# Patient Record
Sex: MALE | Race: WHITE | Employment: OTHER | URBAN - METROPOLITAN AREA
[De-identification: names, ages, dates, MRNs, and addresses within clinical notes are randomized per-mention and may not be internally consistent; named-entity substitution may affect disease eponyms.]

---

## 2017-07-12 ENCOUNTER — HOSPITAL ENCOUNTER (OUTPATIENT)
Dept: OTHER | Age: 52
Discharge: HOME | End: 2017-07-12
Attending: NURSE PRACTITIONER | Admitting: NURSE PRACTITIONER

## 2017-07-12 DIAGNOSIS — M25.511 ACUTE PAIN OF RIGHT SHOULDER: ICD-10-CM

## 2017-07-25 ENCOUNTER — OFFICE VISIT (OUTPATIENT)
Dept: ORTHOPEDIC SURGERY | Age: 52
End: 2017-07-25

## 2017-07-25 VITALS — WEIGHT: 170 LBS | HEIGHT: 70 IN | BODY MASS INDEX: 24.34 KG/M2

## 2017-07-25 DIAGNOSIS — M25.511 RIGHT SHOULDER PAIN, UNSPECIFIED CHRONICITY: Primary | ICD-10-CM

## 2017-07-25 PROCEDURE — 73030 X-RAY EXAM OF SHOULDER: CPT | Performed by: ORTHOPAEDIC SURGERY

## 2017-07-25 PROCEDURE — 99243 OFF/OP CNSLTJ NEW/EST LOW 30: CPT | Performed by: ORTHOPAEDIC SURGERY

## 2017-07-26 ENCOUNTER — TELEPHONE (OUTPATIENT)
Dept: ORTHOPEDIC SURGERY | Age: 52
End: 2017-07-26

## 2017-08-01 ENCOUNTER — OFFICE VISIT (OUTPATIENT)
Dept: ORTHOPEDIC SURGERY | Age: 52
End: 2017-08-01

## 2017-08-01 VITALS — HEIGHT: 70 IN | HEART RATE: 80 BPM | BODY MASS INDEX: 24.33 KG/M2 | WEIGHT: 169.97 LBS

## 2017-08-01 DIAGNOSIS — M75.41 SUBACROMIAL IMPINGEMENT OF RIGHT SHOULDER: ICD-10-CM

## 2017-08-01 DIAGNOSIS — M75.81 ROTATOR CUFF TENDONITIS, RIGHT: Primary | ICD-10-CM

## 2017-08-01 DIAGNOSIS — M75.111 INCOMPLETE TEAR OF RIGHT ROTATOR CUFF: ICD-10-CM

## 2017-08-01 DIAGNOSIS — M75.21 BICEPS TENDONITIS ON RIGHT: ICD-10-CM

## 2017-08-01 PROBLEM — M75.80 ROTATOR CUFF TENDONITIS: Status: ACTIVE | Noted: 2017-08-01

## 2017-08-01 PROCEDURE — 20610 DRAIN/INJ JOINT/BURSA W/O US: CPT | Performed by: ORTHOPAEDIC SURGERY

## 2017-08-01 PROCEDURE — 99214 OFFICE O/P EST MOD 30 MIN: CPT | Performed by: ORTHOPAEDIC SURGERY

## 2017-08-01 RX ORDER — MELOXICAM 15 MG/1
15 TABLET ORAL DAILY
Qty: 30 TABLET | Refills: 3 | Status: SHIPPED | OUTPATIENT
Start: 2017-08-01 | End: 2018-09-13 | Stop reason: ALTCHOICE

## 2017-08-01 RX ORDER — DEXAMETHASONE SODIUM PHOSPHATE 4 MG/ML
INJECTION, SOLUTION INTRA-ARTICULAR; INTRALESIONAL; INTRAMUSCULAR; INTRAVENOUS; SOFT TISSUE
Qty: 30 ML | Refills: 0 | Status: SHIPPED | OUTPATIENT
Start: 2017-08-01 | End: 2018-09-13

## 2017-08-08 ENCOUNTER — HOSPITAL ENCOUNTER (OUTPATIENT)
Dept: PHYSICAL THERAPY | Age: 52
Discharge: HOME | End: 2017-08-31
Admitting: ORTHOPAEDIC SURGERY

## 2018-09-13 ENCOUNTER — HOSPITAL ENCOUNTER (EMERGENCY)
Age: 53
Discharge: HOME | End: 2018-09-13
Attending: EMERGENCY MEDICINE
Payer: MEDICAID

## 2018-09-13 VITALS
BODY MASS INDEX: 23.8 KG/M2 | DIASTOLIC BLOOD PRESSURE: 106 MMHG | TEMPERATURE: 98.2 F | RESPIRATION RATE: 17 BRPM | SYSTOLIC BLOOD PRESSURE: 162 MMHG | WEIGHT: 170 LBS | HEIGHT: 71 IN | OXYGEN SATURATION: 99 % | HEART RATE: 93 BPM

## 2018-09-13 DIAGNOSIS — J02.9 ACUTE PHARYNGITIS, UNSPECIFIED ETIOLOGY: Primary | ICD-10-CM

## 2018-09-13 PROCEDURE — 99282 EMERGENCY DEPT VISIT SF MDM: CPT

## 2018-09-13 PROCEDURE — 6360000002 HC RX W HCPCS: Performed by: EMERGENCY MEDICINE

## 2018-09-13 PROCEDURE — 96372 THER/PROPH/DIAG INJ SC/IM: CPT

## 2018-09-13 RX ORDER — KETOROLAC TROMETHAMINE 30 MG/ML
30 INJECTION, SOLUTION INTRAMUSCULAR; INTRAVENOUS ONCE
Status: COMPLETED | OUTPATIENT
Start: 2018-09-13 | End: 2018-09-13

## 2018-09-13 RX ORDER — DEXAMETHASONE SODIUM PHOSPHATE 4 MG/ML
4 INJECTION, SOLUTION INTRA-ARTICULAR; INTRALESIONAL; INTRAMUSCULAR; INTRAVENOUS; SOFT TISSUE ONCE
Status: COMPLETED | OUTPATIENT
Start: 2018-09-13 | End: 2018-09-13

## 2018-09-13 RX ADMIN — KETOROLAC TROMETHAMINE 30 MG: 30 INJECTION, SOLUTION INTRAMUSCULAR; INTRAVENOUS at 01:13

## 2018-09-13 RX ADMIN — DEXAMETHASONE SODIUM PHOSPHATE 4 MG: 4 INJECTION, SOLUTION INTRAMUSCULAR; INTRAVENOUS at 01:13

## 2018-09-13 RX ADMIN — PENICILLIN G BENZATHINE 1.2 MILLION UNITS: 1200000 INJECTION, SUSPENSION INTRAMUSCULAR at 01:13

## 2018-09-13 ASSESSMENT — PAIN DESCRIPTION - LOCATION: LOCATION: THROAT

## 2018-09-13 ASSESSMENT — PAIN SCALES - GENERAL
PAINLEVEL_OUTOF10: 6
PAINLEVEL_OUTOF10: 8

## 2018-09-13 ASSESSMENT — PAIN DESCRIPTION - PAIN TYPE: TYPE: ACUTE PAIN

## 2018-09-13 NOTE — ED PROVIDER NOTES
Emergency Department Attending Note    Madiha Guajardo MD    Date of ED VIsit: 9/13/2018    CHIEF COMPLAINT  Dysphagia (Pt sts \"just started today, hurts like hell to swallow\")      HISTORY OF PRESENT ILLNESS  Livan Alvarez III is a 47 y.o. male  With Vital signs of BP (!) 162/106   Pulse 93   Temp 98.2 °F (36.8 °C) (Oral)   Resp 17   Ht 5' 11\" (1.803 m)   Wt 170 lb (77.1 kg)   SpO2 99%   BMI 23.71 kg/m²  who presents to the ED with a complaint of sore throat. Patient seen and evaluated in room 3. Patient complains of a sore throat which is primarily more on the right than the left. He states it is painful to swallow. Son said it started this morning got significantly worse prompting him to get into the emergency department this evening for evaluation. He denies any fevers or chills no nausea vomiting diarrhea constipation or chest pain shortness of breath. His breathing is normal.  He reports no rashes. .  No other complaints, modifying factors or associated symptoms. I have reviewed the following from the nursing documentation. Past Medical History:   Diagnosis Date    CAD (coronary artery disease)     Melanoma in situ of face ([address]      Past Surgical History:   Procedure Laterality Date    CARPAL TUNNEL RELEASE      bilateral     History reviewed. No pertinent family history. Social History     Social History    Marital status: Single     Spouse name: N/A    Number of children: N/A    Years of education: N/A     Occupational History    Not on file.      Social History Main Topics    Smoking status: Current Every Day Smoker     Packs/day: 1.00     Types: Cigarettes    Smokeless tobacco: Never Used    Alcohol use 12.0 oz/week     20 Cans of beer per week    Drug use: No    Sexual activity: Yes     Partners: Female     Other Topics Concern    Not on file     Social History Narrative    No narrative on file     Current Facility-Administered Medications   Medication Dose Route Frequency Provider Last Rate Last Dose    penicillin G benzathine (BICILLIN L-A) 3238329 UNIT/2ML suspension 1.2 Million Units  1.2 Million Units Intramuscular Once Jessica Quinonez MD        ketorolac (TORADOL) injection 30 mg  30 mg Intravenous Once Jessica Quinonez MD        dexamethasone (DECADRON) injection 4 mg  4 mg Intramuscular Once Jessica Quinonez MD         No current outpatient prescriptions on file. No Known Allergies    REVIEW OF SYSTEMS  10 systems reviewed, pertinent positives per HPI otherwise noted to be negative     PHYSICAL EXAM  BP (!) 162/106   Pulse 93   Temp 98.2 °F (36.8 °C) (Oral)   Resp 17   Ht 5' 11\" (1.803 m)   Wt 170 lb (77.1 kg)   SpO2 99%   BMI 23.71 kg/m²   GENERAL APPEARANCE: Awake and alert. Cooperative. In minor distress. HEAD: Normocephalic. Atraumatic. EYES: PERRL. EOM's grossly intact. ENT: Mucous membranes are pink and moist.  Posterior pharynx was reddened. There is no obvious sign of a PTA  NECK: Supple. No lymphadenopathy  HEART: RRR. No murmurs. LUNGS: Respirations unlabored. CTAB. Good air exchange. ABDOMEN: Soft. Non-distended. Non-tender. No masses. No organomegaly. No guarding or rebound. EXTREMITIES: No peripheral edema. Moves all extremities equally. All extremities neurovascularly intact. SKIN: Warm and dry. No acute rashes. ED COURSE/MDM         Patient was treated with  Bicillin Toradol and Decadron IM. He was told he should feel much better by tomorrow afternoon or the next day. If symptoms don't get worse that he does not need to see anybody if they do get worse he should see his primary care doctor and/or return to the emergency department  Old records reviewed. Emergency department course and plan reviewed and results discussed with the patient    CLINICAL IMPRESSION and DISPOSITION  Ana Scales III was stable. and diagnosed with pharyngitis                   Jessica Quinonez MD  09/13/18 [id number]

## 2020-09-30 ENCOUNTER — HOSPITAL ENCOUNTER (OUTPATIENT)
Age: 55
Discharge: HOME | End: 2020-09-30
Payer: MEDICAID

## 2020-09-30 ENCOUNTER — HOSPITAL ENCOUNTER (OUTPATIENT)
Dept: GENERAL RADIOLOGY | Age: 55
Discharge: HOME | End: 2020-09-30
Payer: MEDICAID

## 2020-09-30 PROCEDURE — 72040 X-RAY EXAM NECK SPINE 2-3 VW: CPT
